# Patient Record
Sex: MALE | Race: WHITE
[De-identification: names, ages, dates, MRNs, and addresses within clinical notes are randomized per-mention and may not be internally consistent; named-entity substitution may affect disease eponyms.]

---

## 2019-06-25 ENCOUNTER — APPOINTMENT (OUTPATIENT)
Dept: PHYSICAL MEDICINE AND REHAB | Facility: CLINIC | Age: 34
End: 2019-06-25
Payer: COMMERCIAL

## 2019-06-25 VITALS — BODY MASS INDEX: 28 KG/M2 | WEIGHT: 200 LBS | HEIGHT: 71 IN

## 2019-06-25 DIAGNOSIS — M50.20 OTHER CERVICAL DISC DISPLACEMENT, UNSPECIFIED CERVICAL REGION: ICD-10-CM

## 2019-06-25 DIAGNOSIS — Z78.9 OTHER SPECIFIED HEALTH STATUS: ICD-10-CM

## 2019-06-25 PROBLEM — Z00.00 ENCOUNTER FOR PREVENTIVE HEALTH EXAMINATION: Status: ACTIVE | Noted: 2019-06-25

## 2019-06-25 PROCEDURE — 99203 OFFICE O/P NEW LOW 30 MIN: CPT

## 2019-06-25 RX ORDER — AMOXICILLIN AND CLAVULANATE POTASSIUM 875; 125 MG/1; MG/1
875-125 TABLET, COATED ORAL
Qty: 20 | Refills: 0 | Status: DISCONTINUED | COMMUNITY
Start: 2019-01-11

## 2019-06-25 NOTE — PHYSICAL EXAM
[FreeTextEntry1] : ABBY is a 33 year  yr old male \par \par Constitutional: healthy appearing, NAD, and overweight\par \par NECK\par ROM: flexion to 40, ext to 40, rotation to 80 deg bilat\par \par Inspection: no erythema, warmth\par Spine: no TTP in spinous process\par Soft tissue palpation: no TTP in cervical paraspinals, rhomboids, trapezius\par \par 5/5 bilateral elb flex/ext, WE, finger abd/flex\par sensation intact in bilat UE\par reflexes:  biceps and triceps 2+\par \par Special tests: neg Spurling, Bowen\par \par

## 2019-06-25 NOTE — HISTORY OF PRESENT ILLNESS
[FreeTextEntry1] : Location: right neck\par Quality: ache\par Severity: moderate\par Duration: few wks\par Timing: acute\par Context: maybe carrying baby\par Aggravating Factors: looking down\par Alleviating Factors: nothing\par Associated Symptoms: denies weight loss, fever, chills, change in bowel/bladder habits, redness, warmth, weakness, numbness/tingling, radiation down \par Prior Studies:\par

## 2019-06-25 NOTE — ASSESSMENT
[FreeTextEntry1] : MRI is medically necessary to further evaluation the condition.  \par \par He  has recently tried the following treatments:\par Activity modification      \par Ice/Compression           \par Nsaids                           \par home exercise\par \par

## 2019-08-01 ENCOUNTER — TRANSCRIPTION ENCOUNTER (OUTPATIENT)
Age: 34
End: 2019-08-01

## 2019-08-12 ENCOUNTER — RX RENEWAL (OUTPATIENT)
Age: 34
End: 2019-08-12

## 2019-08-12 DIAGNOSIS — M25.572 PAIN IN LEFT ANKLE AND JOINTS OF LEFT FOOT: ICD-10-CM

## 2019-08-14 ENCOUNTER — APPOINTMENT (OUTPATIENT)
Dept: OTOLARYNGOLOGY | Facility: CLINIC | Age: 34
End: 2019-08-14
Payer: COMMERCIAL

## 2019-08-14 VITALS
HEART RATE: 67 BPM | HEIGHT: 71 IN | WEIGHT: 210 LBS | BODY MASS INDEX: 29.4 KG/M2 | DIASTOLIC BLOOD PRESSURE: 69 MMHG | SYSTOLIC BLOOD PRESSURE: 115 MMHG

## 2019-08-14 DIAGNOSIS — Z83.3 FAMILY HISTORY OF DIABETES MELLITUS: ICD-10-CM

## 2019-08-14 DIAGNOSIS — Z80.0 FAMILY HISTORY OF MALIGNANT NEOPLASM OF DIGESTIVE ORGANS: ICD-10-CM

## 2019-08-14 DIAGNOSIS — H92.01 OTALGIA, RIGHT EAR: ICD-10-CM

## 2019-08-14 DIAGNOSIS — K21.9 GASTRO-ESOPHAGEAL REFLUX DISEASE W/OUT ESOPHAGITIS: ICD-10-CM

## 2019-08-14 DIAGNOSIS — J34.2 DEVIATED NASAL SEPTUM: ICD-10-CM

## 2019-08-14 DIAGNOSIS — Z78.9 OTHER SPECIFIED HEALTH STATUS: ICD-10-CM

## 2019-08-14 PROCEDURE — 92557 COMPREHENSIVE HEARING TEST: CPT

## 2019-08-14 PROCEDURE — 92567 TYMPANOMETRY: CPT

## 2019-08-14 PROCEDURE — 31575 DIAGNOSTIC LARYNGOSCOPY: CPT

## 2019-08-14 PROCEDURE — 99203 OFFICE O/P NEW LOW 30 MIN: CPT | Mod: 25

## 2019-08-14 NOTE — CONSULT LETTER
[Dear  ___] : Dear  [unfilled], [Consult Letter:] : I had the pleasure of evaluating your patient, [unfilled]. [Please see my note below.] : Please see my note below. [Consult Closing:] : Thank you very much for allowing me to participate in the care of this patient.  If you have any questions, please do not hesitate to contact me. [Sincerely,] : Sincerely, [FreeTextEntry3] : Sofi Evans MD\par

## 2019-08-14 NOTE — HISTORY OF PRESENT ILLNESS
[de-identified] : ABBY DAN is a 33 year patient With a two-month history of mild right ear discomfort, tingling sensation, and tightness. He was not sick when it started. He has no otorrhea, tinnitus or dizziness. He tried eardrops but they did not help. He does not think that he has TMJ dysfunction. He denies throat pain or other throat symptoms. He has no nasal or sinus symptoms. He does use ear buds. He tried leaving them out but it did not help. The symptoms are not made better or worse by anything that he can tell. It may be worse at night however. he does not smoke. He did see a physician for cervical disc issues recently. \par   \par History of recurrent ear infections-  no\par Prior ear surgery-no\par History of otologic trauma-  no\par Noise exposure- some from music\par Family history of hearing loss- no\par Prior audiogram- no\par

## 2019-08-14 NOTE — ASSESSMENT
[FreeTextEntry1] : He has a 2 month history of right abnormal auditory perception, otalgia, and tingling sensation in the ear.  His exam was normal. Audiogram was normal except for a notch at 4000 Hz in his hearing. Flexible laryngoscopy shows a deviated septum to the right reflux related laryngeal changes. We discussed possible etiologies such as TMJ dysfunction. He could also be due to his cervical disc disease.\par \par PLAN\par \par -findings and management options discussed in detail with the patient. \par -good aural hygiene\par -avoid using cotton swabs in the ears\par -wax removal drops as needed. \par -noise precautions\par -annual audiogram\par -dental evaluation to rule out TMJD.  \par -reflux precautions and OTC medication as needed\par -we will discuss further work up if his symptoms do not improve.  \par -follow up 2-3 weeks. \par -call and return earlier if any concerns. \par

## 2019-10-17 ENCOUNTER — APPOINTMENT (OUTPATIENT)
Dept: OTOLARYNGOLOGY | Facility: CLINIC | Age: 34
End: 2019-10-17

## 2020-01-08 ENCOUNTER — APPOINTMENT (OUTPATIENT)
Dept: OTOLARYNGOLOGY | Facility: CLINIC | Age: 35
End: 2020-01-08
Payer: COMMERCIAL

## 2020-01-08 DIAGNOSIS — H90.3 SENSORINEURAL HEARING LOSS, BILATERAL: ICD-10-CM

## 2020-01-08 DIAGNOSIS — H93.13 TINNITUS, BILATERAL: ICD-10-CM

## 2020-01-08 DIAGNOSIS — H93.299 OTHER ABNORMAL AUDITORY PERCEPTIONS, UNSPECIFIED EAR: ICD-10-CM

## 2020-01-08 PROCEDURE — 99213 OFFICE O/P EST LOW 20 MIN: CPT

## 2020-01-08 PROCEDURE — 92567 TYMPANOMETRY: CPT

## 2020-01-08 PROCEDURE — 92557 COMPREHENSIVE HEARING TEST: CPT

## 2020-01-08 RX ORDER — METHYLPREDNISOLONE 4 MG/1
4 TABLET ORAL
Qty: 1 | Refills: 0 | Status: DISCONTINUED | COMMUNITY
Start: 2019-07-09 | End: 2020-01-08

## 2020-01-08 NOTE — ASSESSMENT
[FreeTextEntry1] : He has a 3 to four-month history of mild bilateral tinnitus. He also has intermittent episodes of eustachian dysfunction with fullness and ringing in the ears. His ear exam was normal. Audiogram showed no change. He has normal hearing with a notch at 4000 Hz. He has no other symptoms\par  \par PLAN\par \par - findings and management options discussed with the patient. I reviewed possible etiologies of tinnitus. Literatrure was given to the patient. \par - Good aural hygiene.\par - noise precautions\par - repeat audiogram in one year\par - Avoid substances that can make tinnitus worse.  \par - The patient may benefit from a short burst of prednisone if he has exacerbation of the tinnitus. He is going to hold off for now. \par - Tinnitus retraining therapy recommended. \par - nasal steroid spray and/or decongestant or antihistamine as needed for eustachian tube dysfunction\par - we also discussed further workup. The tinnitus is likely related to the mild high frequency sensorineural hearing loss. The chance of retrocochlear pathology is low. we are going to observe him for now. If he has unilateral tinnitus, changes in his hearing or other symptoms, I will send him for further testing\par - follow up in approximately 2 months\par - call and return earlier if any concerns or worsening symptoms

## 2020-01-08 NOTE — CONSULT LETTER
[Dear  ___] : Dear  [unfilled], [Please see my note below.] : Please see my note below. [Consult Closing:] : Thank you very much for allowing me to participate in the care of this patient.  If you have any questions, please do not hesitate to contact me. [Courtesy Letter:] : I had the pleasure of seeing your patient, [unfilled], in my office today. [Sincerely,] : Sincerely, [FreeTextEntry3] : Sofi Evans MD\par

## 2020-01-08 NOTE — HISTORY OF PRESENT ILLNESS
[de-identified] : 8[birth year]Kamini DAN is a 33 year patient With a two-month history of mild right ear discomfort, tingling sensation, and tightness. He was not sick when it started. He has no otorrhea, tinnitus or dizziness. He tried eardrops but they did not help. He does not think that he has TMJ dysfunction. He denies throat pain or other throat symptoms. He has no nasal or sinus symptoms. He does use ear buds. He tried leaving them out but it did not help. The symptoms are not made better or worse by anything that he can tell. It may be worse at night however. he does not smoke. He did see a physician for cervical disc issues recently. \par   \par History of recurrent ear infections-  no\par Prior ear surgery-no\par History of otologic trauma-  no\par Noise exposure- some from music\par Family history of hearing loss- no\par Prior audiogram- no\par   [FreeTextEntry1] : \par 1/8/20- ABBY DAN is here for a 3-4 month history of mild bilateral constant tinnitus. He occasionally has episodes of a fullness in the ears with ringing that lasts several seconds. It occurs in both ears. He had mild dizziness initially but nothing since then. He had right ear pain at his last visit which has resolved. He is not sure if his hearing has changed. He has a history of cervical disc disease which has been better. He denies taking aspirin and is not on new medications. He denies a history of smoking.  He has no neurological symptoms.  Audiogram at his last visit showed bilateral high frequency SNHL at 4000 Hz

## 2020-02-25 ENCOUNTER — APPOINTMENT (OUTPATIENT)
Dept: HEART AND VASCULAR | Facility: CLINIC | Age: 35
End: 2020-02-25
Payer: COMMERCIAL

## 2020-02-25 ENCOUNTER — NON-APPOINTMENT (OUTPATIENT)
Age: 35
End: 2020-02-25

## 2020-02-25 VITALS
HEIGHT: 71 IN | HEART RATE: 70 BPM | SYSTOLIC BLOOD PRESSURE: 120 MMHG | DIASTOLIC BLOOD PRESSURE: 80 MMHG | BODY MASS INDEX: 30.1 KG/M2 | WEIGHT: 215 LBS

## 2020-02-25 DIAGNOSIS — R00.2 PALPITATIONS: ICD-10-CM

## 2020-02-25 DIAGNOSIS — R07.89 OTHER CHEST PAIN: ICD-10-CM

## 2020-02-25 PROCEDURE — 93000 ELECTROCARDIOGRAM COMPLETE: CPT

## 2020-02-25 PROCEDURE — 99203 OFFICE O/P NEW LOW 30 MIN: CPT

## 2020-02-25 NOTE — HISTORY OF PRESENT ILLNESS
[FreeTextEntry1] : Patient is 34 year old male without sig medical history who presents with chronic, intermittent episodes of palpitations which have been ongoing for years. Palpitations occurring both at rest and with exertion. Episodes occurring once q 1-2 months. No associated symptoms of chest pain/SOB. No dizziness of hx of LOC. He has had full cardiac workup in past including echo and 24 hr monitor, all which has been unremarkable.

## 2020-02-25 NOTE — DISCUSSION/SUMMARY
[FreeTextEntry1] : Assessment/Plan:\par Patient is 34 year old male without sig medical history who presents with chronic, intermittent episodes of palpitations (irregular heart beat) which have been ongoing for years. \par \par -Chronic palpitations - of unclear etiology; prior cardiac work up negative. Pt states last episode of palpitations (last week) brought on by exertion. Check ECHO. EKG stress test - r/o arrhythmias. CBC, BMP, TSH, lipids\par \par FU 1month

## 2020-02-25 NOTE — PHYSICAL EXAM
[General Appearance - Well Developed] : well developed [General Appearance - Well Nourished] : well nourished [Well Groomed] : well groomed [Normal Appearance] : normal appearance [No Deformities] : no deformities [General Appearance - In No Acute Distress] : no acute distress [Eyelids - No Xanthelasma] : the eyelids demonstrated no xanthelasmas [Normal Conjunctiva] : the conjunctiva exhibited no abnormalities [Normal Oral Mucosa] : normal oral mucosa [No Oral Pallor] : no oral pallor [Normal Jugular Venous V Waves Present] : normal jugular venous V waves present [No Oral Cyanosis] : no oral cyanosis [Normal Jugular Venous A Waves Present] : normal jugular venous A waves present [No Jugular Venous Gonzalez A Waves] : no jugular venous gonzalez A waves [Heart Sounds] : normal S1 and S2 [Heart Rate And Rhythm] : heart rate and rhythm were normal [Murmurs] : no murmurs present [Respiration, Rhythm And Depth] : normal respiratory rhythm and effort [Exaggerated Use Of Accessory Muscles For Inspiration] : no accessory muscle use [Auscultation Breath Sounds / Voice Sounds] : lungs were clear to auscultation bilaterally [Abdomen Soft] : soft [Abdomen Tenderness] : non-tender [Abdomen Mass (___ Cm)] : no abdominal mass palpated [Nail Clubbing] : no clubbing of the fingernails [Abnormal Walk] : normal gait [Gait - Sufficient For Exercise Testing] : the gait was sufficient for exercise testing [Cyanosis, Localized] : no localized cyanosis [Petechial Hemorrhages (___cm)] : no petechial hemorrhages [Skin Color & Pigmentation] : normal skin color and pigmentation [No Venous Stasis] : no venous stasis [] : no rash [Skin Lesions] : no skin lesions [No Skin Ulcers] : no skin ulcer [No Xanthoma] : no  xanthoma was observed [Oriented To Time, Place, And Person] : oriented to person, place, and time [Affect] : the affect was normal [No Anxiety] : not feeling anxious [Mood] : the mood was normal

## 2020-03-02 ENCOUNTER — APPOINTMENT (OUTPATIENT)
Dept: CARDIOLOGY | Facility: CLINIC | Age: 35
End: 2020-03-02

## 2020-04-25 ENCOUNTER — MESSAGE (OUTPATIENT)
Age: 35
End: 2020-04-25

## 2020-06-12 LAB
SARS-COV-2 IGG SERPL IA-ACNC: 12.9 INDEX
SARS-COV-2 IGG SERPL QL IA: POSITIVE

## 2020-08-04 ENCOUNTER — APPOINTMENT (OUTPATIENT)
Dept: HEART AND VASCULAR | Facility: CLINIC | Age: 35
End: 2020-08-04

## 2021-06-23 ENCOUNTER — APPOINTMENT (OUTPATIENT)
Dept: ORTHOPEDIC SURGERY | Facility: CLINIC | Age: 36
End: 2021-06-23
Payer: COMMERCIAL

## 2021-06-23 DIAGNOSIS — M77.11 LATERAL EPICONDYLITIS, RIGHT ELBOW: ICD-10-CM

## 2021-06-23 DIAGNOSIS — M25.531 PAIN IN RIGHT WRIST: ICD-10-CM

## 2021-06-23 PROCEDURE — 99072 ADDL SUPL MATRL&STAF TM PHE: CPT

## 2021-06-23 PROCEDURE — 99204 OFFICE O/P NEW MOD 45 MIN: CPT

## 2022-03-02 RX ORDER — OSELTAMIVIR PHOSPHATE 75 MG/1
75 CAPSULE ORAL TWICE DAILY
Qty: 1 | Refills: 0 | Status: ACTIVE | COMMUNITY
Start: 2022-03-02 | End: 1900-01-01

## 2022-06-15 ENCOUNTER — APPOINTMENT (OUTPATIENT)
Dept: ORTHOPEDIC SURGERY | Facility: CLINIC | Age: 37
End: 2022-06-15
Payer: COMMERCIAL

## 2022-06-15 PROCEDURE — 20610 DRAIN/INJ JOINT/BURSA W/O US: CPT | Mod: RT

## 2022-06-15 PROCEDURE — 99203 OFFICE O/P NEW LOW 30 MIN: CPT | Mod: 25

## 2022-06-15 NOTE — PROCEDURE
lower back /vaginal pressure since 0000 [de-identified] : Patient has demonstrated limited relief from NSAIDS, rest, exercises / PT, and after discussion of the risks and benefits, the patient has elected to proceed with a corticosteroid injection into the RIGHT knee via an Anterolateral site.\par Confirmed that the patient does not have history of prior adverse reactions, active, infections, or relevant allergies.   There was no erythema or warmth, and the skin was clear.  The skin was sterilized with alcohol and via sterile technique, the knee was injected 3 cc of 1% xylocaine with 40 mg Kenalog.  The injection was completed without complication and a bandage was applied.  The patient tolerated the procedure well and was given post-injection instructions. lower back  since 0000

## 2022-06-15 NOTE — HISTORY OF PRESENT ILLNESS
ED Time Seen By Provider Entered On:  11/30/2019 6:33     Performed On:  11/30/2019 6:33  by Ruben Latham MD               Time Seen By Provider   Time Seen by Provider :   11/30/2019 6:33    Ruben Latham MD - 11/30/2019 6:33                Electronically Signed On 11.30.2019 06:33  ___________________________________________________   Ruben Latham MD     [de-identified] : Patient is a new patient presenting with discomfort in his right knee.  He has a history of having patellofemoral discomfort and had improved in the past with home exercises.  He states yesterday he had discomfort while he was moving it localized to the anterolateral aspect of his knee with some mild swelling.  He states some improvement with anti-inflammatory but still persistent symptoms

## 2022-06-15 NOTE — PHYSICAL EXAM
[de-identified] : Right knee\par \par Constitutional: \par The patient is healthy-appearing and in no apparent distress. \par \par Gait:\par The patient ambulates with a normal gait and no limp.\par \par Cardiovascular System: \par The capillary refill is less than 2 seconds. \par \par Skin: \par There are no skin abnormalities.\par \par Right Knee: \par \par Bony Palpation: \par There is no tenderness of the medial joint line. \par There is no tenderness of the lateral joint line.\par There is no tenderness of the medial femoral chondyle.\par There is no tenderness of the lateral femoral chondyle.\par There is no tenderness of the tibial tubercle.\par There is no tenderness of the superior patella.\par There is no tenderness of the inferior patella.\par There is no tenderness of the medial patellar facet.\par There is tenderness of the lateral patellar facet.\par \par Soft Tissue Palpation: \par There is no tenderness of the medial retinaculum.\par There is no tenderness of the lateral retinaculum.\par There is no tenderness of the quadriceps tendon.\par There is no tenderness of the patella tendon.\par There is no tenderness of the ITB.\par There is no tenderness of the pes anserine.\par \par Active Range of Motion: \par The range of motion at the knee actively and passively is full. \par There is a tight lateral retinculum.\par \par Special Tests: \par There is a negative Apley.\par There is a negative Steinmanns. \par There is a negative Lachman and Anterior Drawer.\par There is a negative Posterior Drawer.  \par There is no varus or valgus laxity.\par \par Strength: \par There is 5/5 hip flexion and 5/5 knee flexion and extension.  \par \par Psychiatric: \par The patient demonstrates a normal mood and affect and is active and alert. [de-identified] : Given patient's reported history and physical examination, x-ray evaluation ( as listed below ) was ordered and performed to aid in diagnosis and treatment of the patient.\par X-ray right knee ( sunrise only ): There is no significant arthritis and there is mild lateral patellar tilt\par

## 2022-06-15 NOTE — ASSESSMENT
[FreeTextEntry1] : Discussed at length with patient exam history and imaging as well as treatment options.  Patient elects cortisone injection today as well as encouraged with home exercises and to avoid hyperflexion the past 90°.  If no improvement consideration of formal physical therapy and ultimately her cyst and pain MRI evaluation and possible arthroscopic lateral release\par \par

## 2022-06-23 ENCOUNTER — NON-APPOINTMENT (OUTPATIENT)
Age: 37
End: 2022-06-23

## 2022-06-23 ENCOUNTER — APPOINTMENT (OUTPATIENT)
Dept: ORTHOPEDIC SURGERY | Facility: CLINIC | Age: 37
End: 2022-06-23
Payer: COMMERCIAL

## 2022-06-23 ENCOUNTER — APPOINTMENT (OUTPATIENT)
Dept: PHYSICAL MEDICINE AND REHAB | Facility: CLINIC | Age: 37
End: 2022-06-23
Payer: COMMERCIAL

## 2022-06-23 DIAGNOSIS — M23.91 UNSPECIFIED INTERNAL DERANGEMENT OF RIGHT KNEE: ICD-10-CM

## 2022-06-23 PROCEDURE — 20610 DRAIN/INJ JOINT/BURSA W/O US: CPT

## 2022-06-23 PROCEDURE — 99214 OFFICE O/P EST MOD 30 MIN: CPT

## 2022-06-23 PROCEDURE — 99213 OFFICE O/P EST LOW 20 MIN: CPT | Mod: 25

## 2022-06-23 RX ORDER — AZITHROMYCIN 250 MG/1
250 TABLET, FILM COATED ORAL
Qty: 2 | Refills: 1 | Status: DISCONTINUED | COMMUNITY
Start: 2022-02-22 | End: 2022-06-23

## 2022-06-23 RX ORDER — AMOXICILLIN 500 MG/1
500 CAPSULE ORAL
Qty: 21 | Refills: 0 | Status: DISCONTINUED | COMMUNITY
Start: 2022-01-10

## 2022-06-23 RX ORDER — METHYLPREDNISOLONE 4 MG/1
4 TABLET ORAL
Qty: 1 | Refills: 0 | Status: ACTIVE | COMMUNITY
Start: 2022-06-23 | End: 1900-01-01

## 2022-06-23 RX ORDER — GABAPENTIN 300 MG/1
300 CAPSULE ORAL DAILY
Qty: 30 | Refills: 0 | Status: ACTIVE | COMMUNITY
Start: 2022-06-23 | End: 1900-01-01

## 2022-06-23 NOTE — ASSESSMENT
[FreeTextEntry1] : Discussed at length with patient if no improvement with aspiration and continued home exercises and stretching patient is to let me know for MRI evaluation

## 2022-06-23 NOTE — PROCEDURE
[de-identified] : Patient has demonstrated limited relief from NSAIDS, rest, exercises / PT, and after discussion of the risks and benefits, the patient has elected to proceed with an aspiration of the RIGHT knee via an Superolateral site.\par Confirmed that the patient does not have history of prior adverse reactions, active, infections, or relevant allergies.   There was no erythema or warmth, and the skin was clear.  The skin was sterilized with alcohol and via sterile technique, 36 cc of serous fluid was aspirated from the knee.  The aspiration was completed without complication and a bandage was applied.  The patient tolerated the procedure well and was given post-injection instructions.

## 2022-06-23 NOTE — HISTORY OF PRESENT ILLNESS
[de-identified] : Patient is an established patient last seen 8 days ago and states persistent discomfort in the knee despite the cortisone injection.  States he is feeling somewhat better but still having swelling with sense of fullness.  Denies any recent fall or trauma

## 2022-06-23 NOTE — ASSESSMENT
[FreeTextEntry1] : Discussed diagnosis and treatment plan including PT.\par He has been doing HEP religiously the last few months without relief.  Nsaids have not helped.  \par MRI needed to plan AMBROSE.  Risks include immunosuppression.\par Educated to avoid back flexion and lift things properly.\par Ice area often.\par He will resume HEP after AMBROSE.\par He had knee injection last wk so do not take medrol til next wk.

## 2022-06-23 NOTE — PHYSICAL EXAM
[FreeTextEntry1] : ABBY is a 36 year male \par Constitutional: healthy appearing, NAD, and normal body habitus\par \par LUMBAR\par ROM: flexion to 20 deg, ext normal\par \par Gait: normal\par \par Inspection: no erythema, warmth\par Spine: no TTP in spinous process\par Bony palpation: no TTP in GT\par \par Soft tissue palpation hip: no TTP in gluteus jovon\par Soft tissue palpation of spine: no TTP in lumbar paraspinals\par \par 5/5 bilateral KE, DF, PF \par sensation intact in bilat LE\par \par Special tests: neg seated SLR\par pop angle 50 deg

## 2022-06-23 NOTE — PHYSICAL EXAM
[de-identified] : Right knee\par \par Constitutional: \par The patient is healthy-appearing and in no apparent distress. \par \par Gait:\par The patient ambulates with a normal gait and no limp.\par \par Cardiovascular System: \par The capillary refill is less than 2 seconds. \par \par Skin: \par There are no skin abnormalities.\par There is a moderate effusion.\par \par Right Knee: \par \par Bony Palpation: \par There is no tenderness of the medial joint line. \par There is no tenderness of the lateral joint line.\par There is no tenderness of the medial femoral chondyle.\par There is no tenderness of the lateral femoral chondyle.\par There is no tenderness of the tibial tubercle.\par There is no tenderness of the superior patella.\par There is no tenderness of the inferior patella.\par There is no tenderness of the medial patellar facet.\par There is tenderness of the lateral patellar facet.\par \par Soft Tissue Palpation: \par There is no tenderness of the medial retinaculum.\par There is no tenderness of the lateral retinaculum.\par There is no tenderness of the quadriceps tendon.\par There is no tenderness of the patella tendon.\par There is no tenderness of the ITB.\par There is no tenderness of the pes anserine.\par \par Active Range of Motion: \par The range of motion at the knee actively and passively is full. \par There is a tight lateral retinculum.\par \par Special Tests: \par There is a negative Apley.\par There is a negative Steinmanns. \par There is a negative Lachman and Anterior Drawer.\par There is a negative Posterior Drawer.  \par There is no varus or valgus laxity.\par \par Strength: \par There is 5/5 hip flexion and 5/5 knee flexion and extension.  \par \par Psychiatric: \par The patient demonstrates a normal mood and affect and is active and alert.

## 2022-06-23 NOTE — HISTORY OF PRESENT ILLNESS
[FreeTextEntry1] : Location: back\par Severity: severe lately\par Duration: 10 years\par Context: picking up something heavy\par Aggravating Factors: bending\par Alleviating Factors: HEP\par Associated Symptoms: denies weight loss, fever, chills, change in bowel/bladder habits, weakness, numbness/tingling, +radiation down right thigh\par Prior Studies: MRI years ago\par

## 2022-06-27 NOTE — HISTORY OF PRESENT ILLNESS
[de-identified] : Patient is an established patient seen one week ago who underwent a right knee cortisone injection with recommendation for continued home exercises for his patellofemoral chondromalacia with lateral tilt.  He states persistent soreness with swelling and denies any new fall or trauma

## 2022-06-27 NOTE — ASSESSMENT
[FreeTextEntry1] : Discussed at length with patient with persistent symptoms and swelling and he elected to proceed with aspiration.  If no improvement the patient recommended for MRI evaluation

## 2022-06-27 NOTE — PHYSICAL EXAM
[de-identified] : Right knee\par \par Constitutional: \par The patient is healthy-appearing and in no apparent distress. \par \par Gait:\par The patient ambulates with a normal gait and no limp.\par \par Cardiovascular System: \par The capillary refill is less than 2 seconds. \par \par Skin: \par There are no skin abnormalities.\par There is a moderate effusion.\par \par Right Knee: \par \par Bony Palpation: \par There is no tenderness of the medial joint line. \par There is no tenderness of the lateral joint line.\par There is no tenderness of the medial femoral chondyle.\par There is no tenderness of the lateral femoral chondyle.\par There is no tenderness of the tibial tubercle.\par There is no tenderness of the superior patella.\par There is no tenderness of the inferior patella.\par There is no tenderness of the medial patellar facet.\par There is tenderness of the lateral patellar facet.\par \par Soft Tissue Palpation: \par There is no tenderness of the medial retinaculum.\par There is no tenderness of the lateral retinaculum.\par There is no tenderness of the quadriceps tendon.\par There is no tenderness of the patella tendon.\par There is no tenderness of the ITB.\par There is no tenderness of the pes anserine.\par \par Active Range of Motion: \par The range of motion at the knee actively and passively is full. \par There is a tight lateral retinculum.\par \par Special Tests: \par There is a negative Apley.\par There is a negative Steinmanns. \par There is a negative Lachman and Anterior Drawer.\par There is a negative Posterior Drawer.  \par There is no varus or valgus laxity.\par \par Strength: \par There is 5/5 hip flexion and 5/5 knee flexion and extension.  \par \par Psychiatric: \par The patient demonstrates a normal mood and affect and is active and alert.

## 2022-06-30 PROBLEM — M23.91 INTERNAL DERANGEMENT OF RIGHT KNEE: Status: ACTIVE | Noted: 2022-06-30

## 2022-07-07 ENCOUNTER — APPOINTMENT (OUTPATIENT)
Dept: ORTHOPEDIC SURGERY | Facility: CLINIC | Age: 37
End: 2022-07-07

## 2022-07-07 PROCEDURE — 20611 DRAIN/INJ JOINT/BURSA W/US: CPT

## 2022-07-07 PROCEDURE — 99213 OFFICE O/P EST LOW 20 MIN: CPT | Mod: 25

## 2022-07-07 NOTE — PHYSICAL EXAM
[de-identified] : PHYSICAL EXAM RIGHT  KNEE\par \par TENDER MEDIAL JOINT LINE \par EFFUSION - NO ERYTHEMA OR CALOR \par AROM\par EXTENSION = 0\par FLEXION = 120 \par \par SPECIAL TESTS\par \par PATELLAR GRIND = NEG\par DRAWER  = NEG\par LACHMAN = NEG\par MACMURRAY = NEG \par \par MOTOR = GROSSLY INTACT\par SENSORY = GROSSLY INTACT \par \par \par  [de-identified] : Date of Exam: 07-\par  \par EXAM:  MRI RIGHT KNEE WITHOUT CONTRAST\par \par \par IMPRESSION:  MRI of the right knee demonstrates:\par \par 1.  Small radial flap tear of the free edge at the junction of the body and posterior horn of the medial meniscus, and intrasubstance degeneration of the body and posterior horn. \par 2.  Large joint effusion. Trace popliteal cyst.\par 3.  Intact cruciate and collateral ligaments.\par 4.  The articular cartilage is preserved.\par

## 2022-07-07 NOTE — HISTORY OF PRESENT ILLNESS
[de-identified] : RIGHT KNEE PAIN \par PAIN STARTED A FEW MONTHS AGO- NO SPECIFIC INJURY \par PT SAW DR. FARIA- HAD 1 CORTISONE INJECTION ( 2 WEEKS AGO) AND AT HOME EXERCISES\par BETTER WITH ADVIL, DUEXIS \par PAIN LEVEL: 2/10\par SWOLLEN \par STIFFNESS \par PT IS HERE TO DRAW FLUID FROM KNEE \par PT HAS MRI OF RIGHT KNEE AVAILABLE

## 2022-07-07 NOTE — PROCEDURE
[Aspiration] : Aspiration [Right] : of the right [Knee Joint] : knee joint [Effusion] : Effusion [Patient] : patient [Alcohol] : Alcohol [___mL] : [unfilled] ~UmL of lidocaine [Ultrasound Guided] : The procedure was ultrasound guided.   [18] : an 18-gauge [___ mL Fluid] : [unfilled] mL of [Yellow] : yellow [Clear] : clear [Tolerated Well] : The patient tolerated the procedure well [None] : none [No Strenuous Activity___day(s)] : avoid strenuous activity for [unfilled] day(s) [PRN] : as needed [de-identified] : .

## 2022-07-20 ENCOUNTER — APPOINTMENT (OUTPATIENT)
Dept: ORTHOPEDIC SURGERY | Facility: CLINIC | Age: 37
End: 2022-07-20

## 2022-07-20 DIAGNOSIS — M25.461 EFFUSION, RIGHT KNEE: ICD-10-CM

## 2022-07-20 PROCEDURE — 99213 OFFICE O/P EST LOW 20 MIN: CPT | Mod: 25

## 2022-07-20 PROCEDURE — 20611 DRAIN/INJ JOINT/BURSA W/US: CPT

## 2022-07-20 NOTE — DISCUSSION/SUMMARY
[de-identified] : \par POST INJECTION INSTRUCTIONS:\par \par INJECTION THERAPY HANDOUT PROVIDED\par \par COLD THERAPY , TYLENOL  PRN\par \par \par

## 2022-07-20 NOTE — PROCEDURE
[de-identified] : INJECTION / ASPIRATION  RIGHT KNEE\par \par Patient has demonstrated limited relief from NSAIDS, rest, exercises / PT , and after discussion of the risks and benefits, the patient has elected to proceed with a\par n ULTRASOUND GUIDED corticosteroid injection into the RIGHT ANTEROMEDIAL KNEE\par  \par Confirmed that the patient does not have history of prior adverse reactions, active, infections, or relevant allergies. There was no effusion, erythema, or warmth, and the skin was clear\par \par The skin was sterilized with alcohol. Ethyl Chloride was used as a topical anesthetic LIDOCAINE 1% FOR SKIN . Routine sterile technique. \par  \par The KNEE JOINT  was injected utilizing ULTRASOUND GUIDANCEwith 4CC PRP PREPARED USING PRPKIT. The injection was completed without complication and a bandage was applied.\par \par The patient tolerated the procedure well and was given post-injection instructions.Rec: Cold therapy, analgesics, avoid heavy activity.\par \par MEDICATION: 4CC PRP PREPARED USING PRPKIT\par \par EFFUSION ASPIRATED:4CC CLEAR YELLOW\par

## 2022-07-20 NOTE — HISTORY OF PRESENT ILLNESS
[de-identified] : RIGHT KNEE PAIN \par PAIN STARTED A FEW MONTHS AGO- NO SPECIFIC INJURY \par PT SAW DR. FARIA- HAD 1 CORTISONE INJECTION ( 2 WEEKS AGO) AND AT HOME EXERCISES\par BETTER WITH ADVIL, DUEXIS \par PAIN LEVEL: 2/10\par SWOLLEN \par STIFFNESS \par PT IS HERE TO DRAW FLUID FROM KNEE \par  PRP TODAY KITS PROVIDED BY PATIENT \par

## 2022-07-20 NOTE — PHYSICAL EXAM
[de-identified] : PHYSICAL EXAM RIGHT  KNEE\par \par TENDER MEDIAL JOINT LINE \par EFFUSION - NO ERYTHEMA OR CALOR \par AROM\par EXTENSION = 0\par FLEXION = 120 \par \par SPECIAL TESTS\par \par PATELLAR GRIND = NEG\par DRAWER  = NEG\par LACHMAN = NEG\par MACMURRAY = NEG \par \par MOTOR = GROSSLY INTACT\par SENSORY = GROSSLY INTACT \par \par \par

## 2022-07-28 ENCOUNTER — NON-APPOINTMENT (OUTPATIENT)
Age: 37
End: 2022-07-28

## 2022-07-28 DIAGNOSIS — M54.16 RADICULOPATHY, LUMBAR REGION: ICD-10-CM

## 2022-08-05 ENCOUNTER — APPOINTMENT (OUTPATIENT)
Dept: PHYSICAL MEDICINE AND REHAB | Facility: CLINIC | Age: 37
End: 2022-08-05

## 2022-08-05 DIAGNOSIS — M51.26 OTHER INTERVERTEBRAL DISC DISPLACEMENT, LUMBAR REGION: ICD-10-CM

## 2022-08-05 PROCEDURE — 64484 NJX AA&/STRD TFRM EPI L/S EA: CPT | Mod: RT

## 2022-08-05 PROCEDURE — 64483 NJX AA&/STRD TFRM EPI L/S 1: CPT | Mod: RT

## 2023-02-27 ENCOUNTER — RESULT REVIEW (OUTPATIENT)
Age: 38
End: 2023-02-27

## 2023-03-24 ENCOUNTER — APPOINTMENT (OUTPATIENT)
Dept: ORTHOPEDIC SURGERY | Facility: CLINIC | Age: 38
End: 2023-03-24

## 2023-03-31 ENCOUNTER — APPOINTMENT (OUTPATIENT)
Dept: ORTHOPEDIC SURGERY | Facility: CLINIC | Age: 38
End: 2023-03-31

## 2023-04-14 ENCOUNTER — APPOINTMENT (OUTPATIENT)
Dept: ORTHOPEDIC SURGERY | Facility: CLINIC | Age: 38
End: 2023-04-14
Payer: COMMERCIAL

## 2023-04-14 PROCEDURE — 20611 DRAIN/INJ JOINT/BURSA W/US: CPT

## 2023-04-14 PROCEDURE — 99213 OFFICE O/P EST LOW 20 MIN: CPT | Mod: 25

## 2023-04-14 NOTE — PHYSICAL EXAM
[de-identified] : PHYSICAL EXAM RIGHT  KNEE\par \par TENDER MEDIAL JOINT LINE \par EFFUSION - NO ERYTHEMA OR CALOR \par AROM\par EXTENSION = 0\par FLEXION = 120 \par \par SPECIAL TESTS\par \par PATELLAR GRIND = NEG\par DRAWER  = NEG\par LACHMAN = NEG\par MACMURRAY = NEG \par \par MOTOR = GROSSLY INTACT\par SENSORY = GROSSLY INTACT \par \par \par

## 2023-04-14 NOTE — HISTORY OF PRESENT ILLNESS
[de-identified] : RIGHT KNEE PAIN \par PAIN STARTED A FEW MONTHS AGO- NO SPECIFIC INJURY \par PRP INJECTION JULY 2022 - VERY HELPFUL \par \par BETTER WITH ADVIL, DUEXIS \par PAIN LEVEL: 2/10\par SWOLLEN \par STIFFNESS \par PT IS HERE TO DRAW FLUID FROM KNEE \par  PRP TODAY KITS PROVIDED BY PATIENT \par

## 2023-04-14 NOTE — PROCEDURE
[de-identified] : INJECTION / ASPIRATION RIGHT KNEE\par \par Patient has demonstrated limited relief from NSAIDS, rest, exercises / PT , and after discussion of the risks and benefits, the patient has elected to proceed with a\par n ULTRASOUND GUIDED corticosteroid injection into the RIGHT ANTEROMEDIAL KNEE\par  \par Confirmed that the patient does not have history of prior adverse reactions, active, infections, or relevant allergies. There was no effusion, erythema, or warmth, and the skin was clear\par \par The skin was sterilized with alcohol. Ethyl Chloride was used as a topical anesthetic LIDOCAINE 1% FOR SKIN. Routine sterile technique. \par  \par The KNEE JOINT was injected utilizing ULTRASOUND GUIDANCEwith 4CC PRP PREPARED USING PRPKIT. The injection was completed without complication and a bandage was applied.\par \par The patient tolerated the procedure well and was given post-injection instructions.Rec: Cold therapy, analgesics, avoid heavy activity.\par \par MEDICATION: 4CC PRP PREPARED USING PRPKIT\par \par EFFUSION ASPIRATED:NONE \par .

## 2023-04-20 ENCOUNTER — APPOINTMENT (OUTPATIENT)
Dept: ORTHOPEDIC SURGERY | Facility: CLINIC | Age: 38
End: 2023-04-20
Payer: COMMERCIAL

## 2023-04-20 PROCEDURE — 99214 OFFICE O/P EST MOD 30 MIN: CPT | Mod: 25

## 2023-04-20 PROCEDURE — 20611 DRAIN/INJ JOINT/BURSA W/US: CPT | Mod: LT

## 2023-04-20 NOTE — DISCUSSION/SUMMARY
[de-identified] : NO  CHEST OR SHOULDER EXERCISES 7 DAYS \par \par CONSIDER KENALOG INJECTION IF NO RELIEF

## 2023-04-20 NOTE — PROCEDURE
[de-identified] : INJECTION LEFT SHOULDER SA SPACE POSTERIOR \par \par Patient has demonstrated limited relief from NSAIDS, rest, exercises / PT, and after discussion of the risks and benefits, the patient has elected to proceed with an ULTRASOUND GUIDED injection into the LEFT SUBACROMIAL  SPACE LATERAL APPROACH \par  \par Confirmed that the patient does not have history of prior adverse reactions, active, infections, or relevant allergies. There was no effusion, erythema, or warmth, and the skin was clear\par \par The skin was sterilized with alcohol. Ethyl Chloride was used as a topical anesthetic. Routine sterile technique. \par The site was injected UTILIZING ULTRASOUND GUIDANCE to confirm appropriate placement of the needle-\par with a mixture of medication and local anesthetic. The injection was completed without complication and a bandage was applied.\par  \par The patient tolerated the procedure well and was given post-injection instructions.Rec: Cold therapy, analgesics, avoid heavy activity.\par MEDICATION: 4cc of 1% xylocaine + KETOROLAC      KETOROLAC 60mg  LOT:[id number] EXPIRATION:03 / 2024\par \par \par

## 2023-04-20 NOTE — PHYSICAL EXAM
[de-identified] : PHYSICAL EXAM LEFT  SHOULDER\par \par NORMAL POSTURE \par AROM 140 / 140 / 90 / 30\par TENDER: SA REGION POSTERIOR \par \par SPECIAL TESTING :\par LIMA - POSITIVE \par KINJAL - POSITIVE \par SPEED TEST - POSITIVE\par \par MCKAY - NEGATIVE \par APPREHENSION AND SUPPRESSION - NEGATIVE \par \par RC STRENGTH TESTING \par SS:  5/5\par SUB 5/5\par IS     5/5\par BICEPS  5/5\par \par SENSATION  - GROSSLY INTACT\par \par \par  [de-identified] : DIAGNOSTIC ULTRASOUND RIGHT SHOULDER\par \par DIAGNOSTIC SONOGRAPHY of the Rotator Cuff Soft Tissue of the RIGHT SHOULDER was performed in Multiple Scan Planes with varying transducer frequencies.\par Imaging of the Supraspinatus Tendon reveals TENDONITIS, WITH OUT SIGNIFICANT / COMPLETE TEAR\par Imaging of the Biceps Tendon reveals no significant tear.\par Imaging of the Subscapularis Tendon reveals no significant tear.\par Imaging of the Infraspinatus Tendon reveals BURSITIS no significant tear.\par Key images were save digitally and reviewed with patient.\par

## 2023-04-20 NOTE — HISTORY OF PRESENT ILLNESS
[de-identified] : LEFT SHOULDER PAIN \par STARTED 3 WEEKS AGO\par RELATED TO CABLE FLY AND BENCH PRESS - NO SPECIFIC INJURY\par PAIN IN POSTERIOR SUB ACROMIAL REGION

## 2023-05-03 RX ORDER — AMOXICILLIN 500 MG/1
500 TABLET, FILM COATED ORAL 3 TIMES DAILY
Qty: 20 | Refills: 0 | Status: DISCONTINUED | COMMUNITY
Start: 2022-08-10 | End: 2023-05-03

## 2023-05-03 RX ORDER — AMOXICILLIN 500 MG/1
500 TABLET, FILM COATED ORAL 3 TIMES DAILY
Qty: 13 | Refills: 0 | Status: DISCONTINUED | COMMUNITY
Start: 2023-02-13 | End: 2023-05-03

## 2023-05-05 ENCOUNTER — APPOINTMENT (OUTPATIENT)
Dept: PHYSICAL MEDICINE AND REHAB | Facility: CLINIC | Age: 38
End: 2023-05-05
Payer: COMMERCIAL

## 2023-05-05 DIAGNOSIS — M47.816 SPONDYLOSIS W/OUT MYELOPATHY OR RADICULOPATHY, LUMBAR REGION: ICD-10-CM

## 2023-05-05 PROCEDURE — 64495 INJ PARAVERT F JNT L/S 3 LEV: CPT | Mod: RT

## 2023-05-05 PROCEDURE — 64493 INJ PARAVERT F JNT L/S 1 LEV: CPT | Mod: RT

## 2023-05-05 NOTE — PROCEDURE
[de-identified] : indication: pain\par \par Fluoroscopically Guided, Contrast Enhanced,  Right L4/5 and L5/S1 Facet Injections\par \par After informed consent, he was placed prone on the fluoroscopy table. Skin over the area was prepped and draped in the usual sterile fashion before being anesthetized with 1% Lidocaine. Then using an oblique approach, a 3 ½ in spinal needle was directed down to the L5/S1 facet joint. Needle placement was confirmed with AP fluoroscopic images. After negative aspiration for blood or cerebrospinal fluid, contrast was instilled under live fluoroscopy and an arthrogram was obtained. It showed good flow in the capsule without any vascular uptake. Then a steroid solution consisting of 20 mg of Kenalog and 1 ml of 1% Lidocaine was instilled. The same procedure was repeated at the    L4/5 facet joint. \par \par He tolerated the procedure well and was discharged in good condition without any complications or complaints.  He  was given discharge instructions and asked to follow-up in clinic in two weeks.\par \par Lidocaine\par Exp 5/2023\par Manufacture: Hikma\par NDC 9302-6644-57\par [id number]\par \par Manufacture GE\par Omnipaque 240\par NDC [id number]\par Exp 5/4/24\par [id number]\par \par Manufacture [employer]\par Kenalog 40 \par NDC [id number]\par Exp 2/23\par LOT [id number]\par

## 2023-06-16 ENCOUNTER — TRANSCRIPTION ENCOUNTER (OUTPATIENT)
Age: 38
End: 2023-06-16

## 2023-06-16 ENCOUNTER — APPOINTMENT (OUTPATIENT)
Dept: ORTHOPEDIC SURGERY | Facility: CLINIC | Age: 38
End: 2023-06-16
Payer: COMMERCIAL

## 2023-06-16 DIAGNOSIS — S46.002A UNSPECIFIED INJURY OF MUSCLE(S) AND TENDON(S) OF THE ROTATOR CUFF OF LEFT SHOULDER, INITIAL ENCOUNTER: ICD-10-CM

## 2023-06-16 PROCEDURE — 20611 DRAIN/INJ JOINT/BURSA W/US: CPT

## 2023-06-16 PROCEDURE — 99213 OFFICE O/P EST LOW 20 MIN: CPT | Mod: 25

## 2023-06-16 NOTE — PROCEDURE
[de-identified] : INJECTION LEFT SHOULDER SA SPACE POSTERIOR \par \par Patient has demonstrated limited relief from NSAIDS, rest, exercises / PT, and after discussion of the risks and benefits, the patient has elected to proceed with an ULTRASOUND GUIDED injection into the LEFT SUBACROMIAL SPACE LATERAL APPROACH \par  \par Confirmed that the patient does not have history of prior adverse reactions, active, infections, or relevant allergies. There was no effusion, erythema, or warmth, and the skin was clear\par \par The skin was sterilized with alcohol. Ethyl Chloride was used as a topical anesthetic. Routine sterile technique. \par The site was injected UTILIZING ULTRASOUND GUIDANCE to confirm appropriate placement of the needle-\par with a mixture of medication and local anesthetic. The injection was completed without complication and a bandage was applied.\par  \par The patient tolerated the procedure well and was given post-injection instructions.Rec: Cold therapy, analgesics, avoid heavy activity.\par MEDICATION: 4cc of 1% xylocaine + 40mg of KENALOG LOT # [id number]  EXP 07/24\par

## 2023-06-16 NOTE — PHYSICAL EXAM
[de-identified] : PHYSICAL EXAM LEFT  SHOULDER\par \par NORMAL POSTURE \par AROM  150 / 150 / 85 / 30 \par TENDER: SA REGION POSTERIOR \par \par SPECIAL TESTING :\par LIMA - POSITIVE \par KINJAL - POSITIVE \par SPEED TEST - POSITIVE\par \par MCKAY - NEGATIVE \par APPREHENSION AND SUPPRESSION - NEGATIVE \par \par RC STRENGTH TESTING \par SS:  5/5\par SUB 5/5\par IS     5/5\par BICEPS  5/5\par \par SENSATION  - GROSSLY INTACT\par \par \par

## 2023-06-16 NOTE — HISTORY OF PRESENT ILLNESS
[de-identified] : LEFT SHOULDER PAIN  \par FOLLOW UP \par RELATED TO CABLE FLY AND BENCH PRESS - NO SPECIFIC INJURY\par PAIN IN POSTERIOR SUB ACROMIAL REGION

## 2023-09-02 ENCOUNTER — NON-APPOINTMENT (OUTPATIENT)
Age: 38
End: 2023-09-02

## 2023-09-22 ENCOUNTER — APPOINTMENT (OUTPATIENT)
Dept: ORTHOPEDIC SURGERY | Facility: CLINIC | Age: 38
End: 2023-09-22
Payer: COMMERCIAL

## 2023-09-22 DIAGNOSIS — M25.512 PAIN IN LEFT SHOULDER: ICD-10-CM

## 2023-09-22 PROCEDURE — 20611 DRAIN/INJ JOINT/BURSA W/US: CPT | Mod: LT

## 2023-09-22 PROCEDURE — 99213 OFFICE O/P EST LOW 20 MIN: CPT | Mod: 25

## 2024-01-09 RX ORDER — AMOXICILLIN 500 MG/1
500 TABLET, FILM COATED ORAL
Qty: 20 | Refills: 0 | Status: ACTIVE | COMMUNITY
Start: 2024-01-09 | End: 1900-01-01

## 2024-02-20 RX ORDER — AMOXICILLIN 500 MG/1
500 TABLET, FILM COATED ORAL 3 TIMES DAILY
Qty: 13 | Refills: 0 | Status: ACTIVE | COMMUNITY
Start: 2024-02-20 | End: 1900-01-01

## 2024-03-27 RX ORDER — AMOXICILLIN 500 MG/1
500 TABLET, FILM COATED ORAL
Qty: 20 | Refills: 0 | Status: ACTIVE | COMMUNITY
Start: 2023-11-10 | End: 1900-01-01

## 2024-03-27 RX ORDER — LIDOCAINE 5% 700 MG/1
5 PATCH TOPICAL
Qty: 30 | Refills: 0 | Status: ACTIVE | COMMUNITY
Start: 2023-11-03 | End: 1900-01-01

## 2024-04-10 ENCOUNTER — APPOINTMENT (OUTPATIENT)
Dept: ORTHOPEDIC SURGERY | Facility: CLINIC | Age: 39
End: 2024-04-10
Payer: COMMERCIAL

## 2024-04-10 DIAGNOSIS — M25.532 PAIN IN LEFT WRIST: ICD-10-CM

## 2024-04-10 PROCEDURE — 20600 DRAIN/INJ JOINT/BURSA W/O US: CPT | Mod: LT

## 2024-04-10 PROCEDURE — 99204 OFFICE O/P NEW MOD 45 MIN: CPT | Mod: 25

## 2024-04-10 NOTE — ASSESSMENT
[FreeTextEntry1] : I had a lengthy discussion with the patient today regarding his atraumatic left wrist pain.  I explained this may be secondary to overuse, possible contusion versus SL ligament sprain.  The patient's recently obtained left wrist MRI was not remarkable for occult bony injury or unstable appearing ligament tears. There is no appreciation of any ischemic changes within the lunate nor any carpal malalignment.  Treatment options were discussed and I recommended an initial nonsurgical, conservative, approach with activity modification, anti-inflammatory medication and full-time splint wear in order to protect the wrist and allow satisfactory healing.  I offered the patient a corticosteroid injection into the left wrist joint for symptom relief, which he preferred.  This was administered without any issues.  The patient will plan to follow back up with me in 4 to 6 weeks if no significant improvement in his symptoms is noted.  All questions were answered.

## 2024-04-10 NOTE — PHYSICAL EXAM
[de-identified] : Physical exam demonstrates the patient to be alert and oriented x 3 and capable of ambulation. The patient is well-developed and well-nourished in no apparent respiratory distress. The majority of the skin is intact bilaterally in the upper extremities without any bilateral elbow lymphadenopathy.  Evaluation of both elbows reveals full symmetric range of motion from full extension to 140 of flexion with full pronation and full supination.   The wrists have symmetric range of motion bilaterally. Nontender over the left anatomic snuffbox or dorsal LT ligament; there is mild tenderness over the dorsal SL interval.  No appreciation of any palpable soft tissue mass over the dorsal aspect of the wrist, specifically SL interval.  There is a negative Bangura's test bilaterally. There is no tenderness over the distal radial ulnar joint or TFCC and no evidence of instability bilaterally. There is no tenderness over the pisotriquetral joint, hamate hook, or CMC joints bilaterally. The patient is nontender over both scaphoids and anatomic snuffbox is bilaterally. There is no clubbing cyanosis or edema.  Full, symmetric digital ROM.   There is good capillary refill of the digits bilaterally. Sensation is intact to light touch bilaterally. [de-identified] : The patient recently obtained a left wrist MRI at [facility] on 4.4.24.  These findings were reviewed.  As per report, there was "no identifiable cause of internal derangement involving the left wrist."  This was consistent with my review of the images as well.

## 2024-04-10 NOTE — PROCEDURE
[FreeTextEntry1] : My impression is that the patient has a left wrist contusion versus dorsal SL sprain. We discussed treatment options and he elected to undergo a left radiocarpal injection. The risks, benefits, and alternatives were discussed with the patient. This included, but was not limited to nerve injury, infection, subcutaneous atrophy, skin depigmentation etc. Under informed consent and sterile conditions the left radiocarpal joint was injected from the dorsal side, about 1 cm distal to the Yosvany's tubercle with a combination of 1/2 cc Kenalog-10 and 1/2 cc of 2% plain lidocaine. It is my hopes that this significantly alleviates the patients symptoms.

## 2024-04-10 NOTE — HISTORY OF PRESENT ILLNESS
[FreeTextEntry1] : 38-year-old male present to the office for atraumatic left wrist pain. Patient localizes the pain to the dorsal aspect of the left wrist.  He is particular symptomatic when putting a lot of axial load and force onto his left wrist.  No numbness or tingling in the hand.  No swelling or bruising.

## 2024-04-24 ENCOUNTER — APPOINTMENT (OUTPATIENT)
Dept: ORTHOPEDIC SURGERY | Facility: CLINIC | Age: 39
End: 2024-04-24
Payer: COMMERCIAL

## 2024-04-24 DIAGNOSIS — S83.241A OTHER TEAR OF MEDIAL MENISCUS, CURRENT INJURY, RIGHT KNEE, INITIAL ENCOUNTER: ICD-10-CM

## 2024-04-24 DIAGNOSIS — M94.261 CHONDROMALACIA, RIGHT KNEE: ICD-10-CM

## 2024-04-24 PROCEDURE — 99213 OFFICE O/P EST LOW 20 MIN: CPT | Mod: NC,25

## 2024-04-24 PROCEDURE — 20611 DRAIN/INJ JOINT/BURSA W/US: CPT | Mod: NC,RT

## 2024-04-24 NOTE — HISTORY OF PRESENT ILLNESS
[de-identified] : RIGHT KNEE PAIN FLARED UP  PT IS HERE FOR KETOROLAC AND TO ROCHELLE FLUID PRP INJECTION- JULY 20, 2022 [facility] INJECTION- APRIL 14, 2023   PREVIOUS HPI RIGHT KNEE PAIN PAIN STARTED A FEW MONTHS AGO- NO SPECIFIC INJURY PRP INJECTION JULY 2022 - VERY HELPFUL BETTER WITH TAM CASTRO PAIN LEVEL: 2/10 SWOLLEN STIFFNESS PT IS HERE TO DRAW FLUID FROM KNEE  PRP TODAY KITS PROVIDED BY PATIENT

## 2024-04-24 NOTE — PHYSICAL EXAM
[de-identified] : PHYSICAL EXAM RIGHT KNEE    TENDER MEDIAL JOINT LINE  EFFUSION - NO ERYTHEMA OR CALOR  AROM  EXTENSION = 0  FLEXION = 120    SPECIAL TESTS PATELLAR GRIND = NEG DRAWER = NEG LACHMAN = NEG MACMURRAY = NEG    MOTOR = GROSSLY INTACT  SENSORY = GROSSLY INTACT

## 2024-04-24 NOTE — DISCUSSION/SUMMARY
[de-identified] :  PATIENT HAS ELECTED TO PROCEED WITH KETOROLAC INJECTION KNEE  RISKS AND BENEFITS DISCUSSED - VERBAL CONSENT OBTAINED SEE PROCEDURE NOTE   POST INJECTION INSTRUCTIONS:   COLD THERAPY , ANALGESICS PRN   HOME STRETCHING AND EXERCISES QD  -  KNEE OA HANDOUT ELASTIC KNEE SUPPORT PRN ARCH SUPPORTS OR SUPPORTIVE SHOES WITH ARCH SUPPORT   CONSIDER  PHYSICAL THERAPY 2 X 4-6 WEEKS  CONSIDER PRP

## 2024-05-13 ENCOUNTER — APPOINTMENT (OUTPATIENT)
Dept: FAMILY MEDICINE | Facility: CLINIC | Age: 39
End: 2024-05-13
Payer: COMMERCIAL

## 2024-05-13 ENCOUNTER — NON-APPOINTMENT (OUTPATIENT)
Age: 39
End: 2024-05-13

## 2024-05-13 DIAGNOSIS — R42 DIZZINESS AND GIDDINESS: ICD-10-CM

## 2024-05-13 PROCEDURE — 99214 OFFICE O/P EST MOD 30 MIN: CPT

## 2024-05-13 RX ORDER — DOXYCYCLINE HYCLATE 100 MG/1
100 TABLET ORAL TWICE DAILY
Qty: 14 | Refills: 0 | Status: ACTIVE | COMMUNITY
Start: 2024-05-13 | End: 1900-01-01

## 2024-05-13 RX ORDER — MECLIZINE HYDROCHLORIDE 25 MG/1
25 TABLET ORAL 3 TIMES DAILY
Qty: 21 | Refills: 0 | Status: ACTIVE | COMMUNITY
Start: 2024-05-13 | End: 1900-01-01

## 2024-05-13 NOTE — HISTORY OF PRESENT ILLNESS
[Home] : at home, [unfilled] , at the time of the visit. [Medical Office: ([facility])___] : at the medical office located in  [Verbal consent obtained from patient] : the patient, [unfilled] [FreeTextEntry8] : ABBY DAN is a 38 year old male presenting with vertigo, pressure in head, 'could hear heartbeat in left ear.'  Occurred while walking 2 days prior. Taking Amoxicillin since then. Take pain control.  Does have history of sensorineural hearing loss and tinnitus but states it is not similar to those symptoms.  No known Personal or familial cardiac history, no smoking.

## 2024-05-14 ENCOUNTER — APPOINTMENT (OUTPATIENT)
Dept: OTOLARYNGOLOGY | Facility: CLINIC | Age: 39
End: 2024-05-14

## 2024-06-01 ENCOUNTER — OUTPATIENT (OUTPATIENT)
Dept: OUTPATIENT SERVICES | Facility: HOSPITAL | Age: 39
LOS: 1 days | End: 2024-06-01
Payer: COMMERCIAL

## 2024-06-01 DIAGNOSIS — R42 DIZZINESS AND GIDDINESS: ICD-10-CM

## 2024-06-01 DIAGNOSIS — Z00.8 ENCOUNTER FOR OTHER GENERAL EXAMINATION: ICD-10-CM

## 2024-06-01 PROCEDURE — 93880 EXTRACRANIAL BILAT STUDY: CPT | Mod: 26

## 2024-06-01 PROCEDURE — 93880 EXTRACRANIAL BILAT STUDY: CPT

## 2024-06-02 DIAGNOSIS — R42 DIZZINESS AND GIDDINESS: ICD-10-CM

## 2025-01-15 NOTE — PROCEDURE
January 15, 2025      [facility]  [facility] [facility] [address]  Phone: [phone]       Patient: Sampson Ly   YOB: 1990  Date of Visit: 01/15/2025    To Whom It May Concern:    Meghana Ly  was at [facility] on 01/15/2025. The patient may return to work/school on 1/17/2025 with no restrictions. If you have any questions or concerns, or if I can be of further assistance, please do not hesitate to contact me.    Sincerely,    SOLO Diaz     
[de-identified] : Patient has demonstrated limited relief from NSAIDS, rest, exercises / PT, and after discussion of the risks and benefits, the patient has elected to proceed with an aspiration of the RIGHT knee via an superolateral site.\par Confirmed that the patient does not have history of prior adverse reactions, active, infections, or relevant allergies.   There was no erythema or warmth, and the skin was clear.  The skin was sterilized with alcohol and via sterile technique, 35 cc of serous fluid was aspirated from the knee.  The aspiration was completed without complication and a bandage was applied.  The patient tolerated the procedure well and was given post-injection instructions.\par

## 2025-04-21 RX ORDER — IVERMECTIN 3 MG/1
3 TABLET ORAL DAILY
Qty: 25 | Refills: 0 | Status: ACTIVE | COMMUNITY
Start: 2025-04-21 | End: 1900-01-01